# Patient Record
(demographics unavailable — no encounter records)

---

## 2024-11-29 NOTE — ASSESSMENT
[FreeTextEntry1] : 59-year-old male here accompanied by his children who provided the history.  Patient's children reports that the patient fell on 11/22/2024.  He is seen in the hospital where x-rays and CAT scans were taken which confirmed: Displaced comminuted intra-articular fracture of the medial malleolus. Displaced comminuted intra-articular fracture of the posterior malleolus. Displaced comminuted intra-articular fracture of the distal fibula extending superiorly to the syndesmosis associated with multiple intra-articular fracture fragments.  The patient was placed in a plaster splint.  He was provided crutches to assist in the nonweightbearing process and advised follow-up with an orthopedic specialist.  Physical examination of the left ankle/lower extremity: Splint is fitting the patient well.  Sensation is intact distally.  Neuro vastly intact distally.  No signs of infection.  Good capillary refill.  X-rays and CAT scans were reviewed from the hospital system which confirmed the results provided in the HPI  Tx: Results of CAT scan were discussed in depth.  The patient was remained in his splint at this time in an effort to immobilize the ankle joint and fractures.  Discussed possible and probable operative management.  Red flag symptoms were discussed.  Hygiene was discussed.  Strongly encourage patient to remain nonweightbearing at this time.  Expresses full understanding.  I will give the patient follow-up and surgical consultation with our foot and ankle surgeon for possible surgical planning ASAP. All questions and concerns addressed to patient's satisfaction. Patient expresses full understanding of treatment plan.

## 2024-12-02 NOTE — HISTORY OF PRESENT ILLNESS
[de-identified] : Patient and his family members presenting for his left ankle injury.  Exam along with history and discussion of treatment were performed utilizing a certified Cantonese .  Patient injured his ankle approximately a week and a half ago.  He was seen in the hospital and subsequently by the urgent care.  His pain is well-controlled.  Does not endorse any calf pain chest pain or shortness of breath.

## 2024-12-02 NOTE — DISCUSSION/SUMMARY
[de-identified] : I had a lengthy discussion with the patient and his family regarding his condition and treatment options available.  Unfortunately this is injury which is unstable and is optimally treated with surgery.  Surgery would consist of a trimalleolar ankle fracture open reduction internal fixation of the left ankle.    I had a lengthy discussion with the patient regarding the risks, benefits, and alternative of surgery. The risks of surgery discussed included but were not limited to infection, wound issues/breakdown, nonunion, malunion, suboptimal outcome, neurovascular compromise, failure of surgery, need for revision surgery, deep vein thrombosis, pulmonary embolism, loss of limb, and loss of life. We also discussed the expected postoperative recovery protocol including periods of immobilization and weight bearing restriction. The patient wished to proceed with surgery.   We will schedule surgery for next week as it has already been a week and a half since his injury.

## 2024-12-02 NOTE — PHYSICAL EXAM
[de-identified] : He is alert oriented x 3.  Pleasant cooperative.  Left lower extremity was examined.  I did leave the ankle in the splint on account of the unstable nature of his injury.  He is moving his toes.  There is good cap refill.  He is neurovascular intact.

## 2024-12-02 NOTE — DATA REVIEWED
[FreeTextEntry1] : 3 views of the left ankle ordered reviewed by me personally.  I also reviewed the initial x-rays along with CT scan.  The x-rays are consistent with a Haraguchi type II trimalleolar ankle fracture.  It is well reduced however there is residual step-off involving the posterior malleolus.

## 2025-01-30 NOTE — ASSESSMENT
[FreeTextEntry1] : 6 weeks status post open reduction internal fixation of a left trimalleolar ankle fracture healing uneventfully.  Wounds of healed sufficiently for staple removal.  He can begin to weight-bear as tolerated in a cam boot.  Will see him back in the office in 4 weeks time with repeat radiographs.  During the interim I like him to work on stretching out his Achilles tendon with a therapy band.  He does not need to sleep in the boot.  He can shower but should avoid baths or soaks for 2 weeks.  Continue with Tylenol or over-the-counter NSAIDs for pain relief.  All questions answered to his and his daughter satisfaction they agree with the plan.  Interview completed with a Cantonese  #[id number]

## 2025-01-30 NOTE — IMAGING
[de-identified] : Pleasant middle-age gentleman walks and utilizing his crutches with great facility.  He is in no distress.  He is accompanied by his granddaughter in the exam room.  Physical examination: Left ankle: Surgical incisions are clean dry intact no erythema duration or fluctuance.  Some mild minimal skin necrosis noted in the midportion of the lateral incision.  Able to range ankle over short arc.  Normal sensation grossly over the dorsal and plantar aspects of the foot.  Good capillary refill.  Radiographs: Left ankle (AP, lateral, mortise): Anatomically Left trimalleolar ankle fracture.  Heart remains in place with no evidence of loosening.  Talus sits in mortise appropriately.

## 2025-01-30 NOTE — HISTORY OF PRESENT ILLNESS
[de-identified] : 59-year-old gentleman returns for interval follow-up status post open reduction droll fixation left trimalleolar ankle fracture on December 16, 2024.  Patient is abided by nonweightbearing precaution.  Denies any fevers or drainage.  Pain well-controlled with Tylenol.  Denies any sensory complaints.  Returns today accompanied by his daughter acts as  for Mandarin.

## 2025-02-27 NOTE — HISTORY OF PRESENT ILLNESS
[de-identified] : 59-year-old gentleman returns for follow-up status post open reduction fixation left trimalleolar ankle fracture December 16, 2024.  He returns today weightbearing as tolerated in a cam boot.  He is not currently taking any medication for pain.  Denies any fevers or drainage.  No sensory complaints.  Notes that his ankle is swollen.  Interview conducted with Cantonese  #[id number]

## 2025-02-27 NOTE — ASSESSMENT
[FreeTextEntry1] : 59-year-old gentleman now 10 weeks status post open reduction droll fixation of a left ankle fracture.  His fracture is healing uneventfully.  Swelling is normal and takes a long time to resolve.  He is healed this fracture enough to transition out of the boot into a sneaker.  I would like him to try some physical therapy to improve the motion in his ankle and the function in his ankle.  Will see him back in my office in 2 to 3 months time for repeat evaluation with x-ray.  If he has any problems I am happy to see him back sooner.  All questions were answered to his satisfaction and the patient agrees to the plan.

## 2025-02-27 NOTE — IMAGING
[de-identified] : Pleasant middle-age gentleman walks into my office great facility utilizing the cam boot.  Physical examination: Left ankle: Surgical incisions are clean dry intact no erythema duration or fluctuance.  There is a small eschar on the lateral wound with no surrounding erythema duration fluctuance.  Ankle motion demonstrates 10 degrees of dorsiflexion 20 degrees of plantarflexion.  Calves are soft no cords.  Although there is some moderate swelling about his ankle there is no tenderness about the surgical sites or over his ankle malleoli.  Midfoot is relatively supple.  Normal sensation dorsal plantar aspect foot.  Good capillary refill.  Radiographs: Left ankle (AP, lateral, mortise): Well aligned trimalleolar ankle fracture with intact hardware.  No evidence of hardware loosening.  Joint spaces are preserved.